# Patient Record
(demographics unavailable — no encounter records)

---

## 2024-10-18 NOTE — REASON FOR VISIT
[Home] : at home, [unfilled] , at the time of the visit. [Medical Office: ([facility])___] : at the medical office located in  [Follow-Up: _____] : a [unfilled] follow-up visit  [Mother] : mother [Medical Records] : medical records [FreeTextEntry2] : mother [FreeTextEntry3] : mother

## 2024-10-18 NOTE — HISTORY OF PRESENT ILLNESS
[FreeTextEntry2] :  Eugenie is a 7y10m old girl with past medical history significant for 2 hospitalizations for hip pain presenting for followup evaluation of pubertal development. Breast buds were first noticed around age 6-7, attributed to being overweight. Pubic hair and body odor were noticed at age 7. She had no axillary hair. She had mood swings. No vaginal discharge, bleeding or spotting.  On 7/1/24 she had the following bloodwork done: unremarkable CBC, CMP, ESR, CRP, T4 8.5 ug/dl, negative thyroid antibodies, LH (eso) 0.040 miu/ml, FSH 1.6 miu/ml, estradiol 2.6 pg/ml, normal testosterone, 17OHP, progesterone, 17OHpregnenolone, 11 deoxycortisol, DOC, cortisol 7.9, mildly elevated androstenedione 43 ng/dl (ref <10-17), DHEA 226 ng/dl (ref 6-7y <111). She also had a bone age done at [facility] in [place] which was read as 10y6m, advanced.  Since her last visit, DAMON has been well with no recent illness or hospitalization. She is seeing a local endocrinologist and had a GnRH stim test on 10/3-10/4. She had one blood draw after 60 minutes, and 24 hours later. She also had another bone age done, results pending. Family is likely moving forward with supprelin and would like my opinion.  GnRH Stim: 10/3: 60 minute: FSH 14 miu/ml, LH 1.6 miu/ml, estradiol 5.9 pg/ml, DHEA 70 10/4: LH 1.4 miu/ml, FSH 4.7 miu/ml, estradiol 67.4 pg/ml

## 2024-10-18 NOTE — CONSULT LETTER
[Dear  ___] : Dear  [unfilled], [Consult Letter:] : I had the pleasure of evaluating your patient, [unfilled]. [Please see my note below.] : Please see my note below. [Consult Closing:] : Thank you very much for allowing me to participate in the care of this patient.  If you have any questions, please do not hesitate to contact me. [Sincerely,] : Sincerely, [FreeTextEntry3] : Mary Anna MD [facility] Physician [facility] Division of Pediatric Endocrinology